# Patient Record
Sex: MALE | Race: ASIAN | NOT HISPANIC OR LATINO | URBAN - METROPOLITAN AREA
[De-identification: names, ages, dates, MRNs, and addresses within clinical notes are randomized per-mention and may not be internally consistent; named-entity substitution may affect disease eponyms.]

---

## 2023-05-10 ENCOUNTER — OFFICE VISIT (OUTPATIENT)
Dept: PRIMARY CARE | Facility: CLINIC | Age: 53
End: 2023-05-10
Payer: COMMERCIAL

## 2023-05-10 ENCOUNTER — LAB (OUTPATIENT)
Dept: LAB | Facility: LAB | Age: 53
End: 2023-05-10
Payer: COMMERCIAL

## 2023-05-10 VITALS
BODY MASS INDEX: 25.43 KG/M2 | WEIGHT: 162 LBS | HEART RATE: 76 BPM | SYSTOLIC BLOOD PRESSURE: 120 MMHG | DIASTOLIC BLOOD PRESSURE: 74 MMHG | HEIGHT: 67 IN

## 2023-05-10 DIAGNOSIS — F41.1 GAD (GENERALIZED ANXIETY DISORDER): ICD-10-CM

## 2023-05-10 DIAGNOSIS — Z00.00 ENCOUNTER FOR PREVENTIVE HEALTH EXAMINATION: ICD-10-CM

## 2023-05-10 DIAGNOSIS — Z11.59 ENCOUNTER FOR HEPATITIS C SCREENING TEST FOR LOW RISK PATIENT: ICD-10-CM

## 2023-05-10 DIAGNOSIS — Z00.00 ENCOUNTER FOR PREVENTIVE HEALTH EXAMINATION: Primary | ICD-10-CM

## 2023-05-10 DIAGNOSIS — F51.04 CHRONIC INSOMNIA: ICD-10-CM

## 2023-05-10 DIAGNOSIS — Z12.11 ENCOUNTER FOR SCREENING FOR MALIGNANT NEOPLASM OF COLON: ICD-10-CM

## 2023-05-10 PROBLEM — E78.2 MIXED HYPERLIPIDEMIA: Status: ACTIVE | Noted: 2023-05-10

## 2023-05-10 PROBLEM — F60.5 OBSESSIVE-COMPULSIVE PERSONALITY DISORDER (MULTI): Status: ACTIVE | Noted: 2023-05-10

## 2023-05-10 PROBLEM — J44.9 CHRONIC OBSTRUCTIVE PULMONARY DISEASE (MULTI): Status: ACTIVE | Noted: 2023-05-10

## 2023-05-10 PROBLEM — E55.9 VITAMIN D DEFICIENCY: Status: ACTIVE | Noted: 2023-05-10

## 2023-05-10 LAB
ALANINE AMINOTRANSFERASE (SGPT) (U/L) IN SER/PLAS: 54 U/L (ref 10–52)
ALBUMIN (G/DL) IN SER/PLAS: 4.2 G/DL (ref 3.4–5)
ALKALINE PHOSPHATASE (U/L) IN SER/PLAS: 59 U/L (ref 33–120)
ANION GAP IN SER/PLAS: 8 MMOL/L (ref 10–20)
ASPARTATE AMINOTRANSFERASE (SGOT) (U/L) IN SER/PLAS: 45 U/L (ref 9–39)
BILIRUBIN TOTAL (MG/DL) IN SER/PLAS: 0.3 MG/DL (ref 0–1.2)
CALCIUM (MG/DL) IN SER/PLAS: 9.2 MG/DL (ref 8.6–10.3)
CARBON DIOXIDE, TOTAL (MMOL/L) IN SER/PLAS: 29 MMOL/L (ref 21–32)
CHLORIDE (MMOL/L) IN SER/PLAS: 105 MMOL/L (ref 98–107)
CHOLESTEROL (MG/DL) IN SER/PLAS: 226 MG/DL (ref 0–199)
CHOLESTEROL IN HDL (MG/DL) IN SER/PLAS: 44.8 MG/DL
CHOLESTEROL/HDL RATIO: 5
CREATININE (MG/DL) IN SER/PLAS: 1.1 MG/DL (ref 0.5–1.3)
ERYTHROCYTE DISTRIBUTION WIDTH (RATIO) BY AUTOMATED COUNT: 12.2 % (ref 11.5–14.5)
ERYTHROCYTE MEAN CORPUSCULAR HEMOGLOBIN CONCENTRATION (G/DL) BY AUTOMATED: 32.6 G/DL (ref 32–36)
ERYTHROCYTE MEAN CORPUSCULAR VOLUME (FL) BY AUTOMATED COUNT: 86 FL (ref 80–100)
ERYTHROCYTES (10*6/UL) IN BLOOD BY AUTOMATED COUNT: 5.53 X10E12/L (ref 4.5–5.9)
ESTIMATED AVERAGE GLUCOSE FOR HBA1C: 120 MG/DL
GFR MALE: 81 ML/MIN/1.73M2
GLUCOSE (MG/DL) IN SER/PLAS: 78 MG/DL (ref 74–99)
HEMATOCRIT (%) IN BLOOD BY AUTOMATED COUNT: 47.6 % (ref 41–52)
HEMOGLOBIN (G/DL) IN BLOOD: 15.5 G/DL (ref 13.5–17.5)
HEMOGLOBIN A1C/HEMOGLOBIN TOTAL IN BLOOD: 5.8 %
HEPATITIS C VIRUS AB PRESENCE IN SERUM: NONREACTIVE
LDL: 148 MG/DL (ref 0–99)
LEUKOCYTES (10*3/UL) IN BLOOD BY AUTOMATED COUNT: 5 X10E9/L (ref 4.4–11.3)
PLATELETS (10*3/UL) IN BLOOD AUTOMATED COUNT: 169 X10E9/L (ref 150–450)
POTASSIUM (MMOL/L) IN SER/PLAS: 4.2 MMOL/L (ref 3.5–5.3)
PROSTATE SPECIFIC AG (NG/ML) IN SER/PLAS: 0.45 NG/ML (ref 0–4)
PROTEIN TOTAL: 7.4 G/DL (ref 6.4–8.2)
SODIUM (MMOL/L) IN SER/PLAS: 138 MMOL/L (ref 136–145)
THYROTROPIN (MIU/L) IN SER/PLAS BY DETECTION LIMIT <= 0.05 MIU/L: 2.11 MIU/L (ref 0.44–3.98)
TRIGLYCERIDE (MG/DL) IN SER/PLAS: 166 MG/DL (ref 0–149)
UREA NITROGEN (MG/DL) IN SER/PLAS: 17 MG/DL (ref 6–23)
VLDL: 33 MG/DL (ref 0–40)

## 2023-05-10 PROCEDURE — 80061 LIPID PANEL: CPT

## 2023-05-10 PROCEDURE — 83036 HEMOGLOBIN GLYCOSYLATED A1C: CPT

## 2023-05-10 PROCEDURE — 3008F BODY MASS INDEX DOCD: CPT | Performed by: STUDENT IN AN ORGANIZED HEALTH CARE EDUCATION/TRAINING PROGRAM

## 2023-05-10 PROCEDURE — 36415 COLL VENOUS BLD VENIPUNCTURE: CPT

## 2023-05-10 PROCEDURE — 86803 HEPATITIS C AB TEST: CPT

## 2023-05-10 PROCEDURE — 80053 COMPREHEN METABOLIC PANEL: CPT

## 2023-05-10 PROCEDURE — 84443 ASSAY THYROID STIM HORMONE: CPT

## 2023-05-10 PROCEDURE — 99214 OFFICE O/P EST MOD 30 MIN: CPT | Performed by: STUDENT IN AN ORGANIZED HEALTH CARE EDUCATION/TRAINING PROGRAM

## 2023-05-10 PROCEDURE — 85027 COMPLETE CBC AUTOMATED: CPT

## 2023-05-10 PROCEDURE — 84153 ASSAY OF PSA TOTAL: CPT

## 2023-05-10 PROCEDURE — 99396 PREV VISIT EST AGE 40-64: CPT | Performed by: STUDENT IN AN ORGANIZED HEALTH CARE EDUCATION/TRAINING PROGRAM

## 2023-05-10 PROCEDURE — 4004F PT TOBACCO SCREEN RCVD TLK: CPT | Performed by: STUDENT IN AN ORGANIZED HEALTH CARE EDUCATION/TRAINING PROGRAM

## 2023-05-10 RX ORDER — TRAZODONE HYDROCHLORIDE 150 MG/1
150 TABLET ORAL DAILY
COMMUNITY
End: 2023-05-10 | Stop reason: SDUPTHER

## 2023-05-10 RX ORDER — SERTRALINE HYDROCHLORIDE 100 MG/1
50 TABLET, FILM COATED ORAL DAILY
COMMUNITY
End: 2023-11-28 | Stop reason: SDUPTHER

## 2023-05-10 RX ORDER — TRAZODONE HYDROCHLORIDE 150 MG/1
150 TABLET ORAL DAILY
Qty: 90 TABLET | Refills: 1 | Status: SHIPPED | OUTPATIENT
Start: 2023-05-10 | End: 2023-11-28 | Stop reason: SDUPTHER

## 2023-05-10 ASSESSMENT — PATIENT HEALTH QUESTIONNAIRE - PHQ9
SUM OF ALL RESPONSES TO PHQ9 QUESTIONS 1 AND 2: 0
1. LITTLE INTEREST OR PLEASURE IN DOING THINGS: NOT AT ALL
2. FEELING DOWN, DEPRESSED OR HOPELESS: NOT AT ALL

## 2023-05-10 NOTE — PROGRESS NOTES
Subjective   Patient ID: Maged Mcbride is a 52 y.o. male who presents for Med Refill and Annual Exam.    HPI  Diet is well balanced.  Exercises regularly.  Due for colon cancer screening.  Vaccines are up to date.  Dental exam is not up to date.  Vision exam is not up to date.  Patient is fasting for labs today.    Other concerns addressed today include: medication refills  Anxiety  He has been on Zoloft 100 mg for the last, around, 10 years and he believes its contributing to his sleep issues.  He at times has trouble falling asleep, which the trazodone 150 mg helps with, but he does wake several times during the night for no reason that he can pinpoint.  He would like to try to come off of the Zoloft and see if his sleep improves.  He states he started the medication on recommendation from his wife because she says that he has a short temper and she has seen a lot of improvement since he has been on the Zoloft.  He is willing to go on to another medication if his mood seems to be worse, but he wants to see how things are off of it.  He does have a lot of 100 mg tabs left at this time so he does not need a refill.      Insomnia  He is taking trazodone 150 mg pretty much nightly for sleep.  It is working okay, and he would like to continue it for now.  He does need a refill of this medication.  He has never tried anything else for sleep, but again, he thinks the Zoloft may be contributing to his insomnia.    Review of Systems  All pertinent positive symptoms are included in the history of present illness.    All other systems have been reviewed and are negative and noncontributory to this patient's current ailments.     Social History     Tobacco Use    Smoking status: Every Day     Packs/day: 0.50     Years: 37.00     Pack years: 18.50     Types: Cigarettes    Smokeless tobacco: Current   Vaping Use    Vaping status: Not on file   Substance Use Topics    Alcohol use: Not on file      No past surgical history on  "file.   No Known Allergies     Current Outpatient Medications   Medication Sig Dispense Refill    sertraline (Zoloft) 100 mg tablet Take 1 tablet (100 mg) by mouth once daily. as directed      traZODone (Desyrel) 150 mg tablet Take 1 tablet (150 mg) by mouth once daily. 90 tablet 1     No current facility-administered medications for this visit.        Patient Active Problem List   Diagnosis    Chronic insomnia    Chronic obstructive pulmonary disease (CMS/HCC)    CARMEN (generalized anxiety disorder)    Mixed hyperlipidemia    Obsessive-compulsive personality disorder (CMS/HCC)    Vitamin D deficiency      Immunization History   Administered Date(s) Administered    Pfizer Purple Cap SARS-CoV-2 05/05/2021, 07/05/2021       Objective   VITALS  /74   Pulse 76   Ht 1.702 m (5' 7\")   Wt 73.5 kg (162 lb)   BMI 25.37 kg/m²      Physical Exam  CONSTITUTIONAL - well nourished, well developed, looks like stated age, in no acute distress, not ill-appearing, and not tired appearing  SKIN - normal skin color and pigmentation, normal skin turgor without rash, lesions, or nodules visualized  HEAD - no trauma, normocephalic  EYES - pupils are equal and reactive to light, extraocular muscles are intact, and normal external exam  ENT - TM's intact, no injection, no signs of infection  NECK - supple without rigidity, no neck mass was observed, no thyromegaly or thyroid nodules  CHEST - clear to auscultation, no wheezing, no crackles and no rales, good effort  CARDIAC - regular rate and regular rhythm, no skipped beats, no murmur  ABDOMEN - no organomegaly, soft, nontender, nondistended, normal bowel sounds, no guarding/rebound/rigidity, negative McBurney sign and negative Cohen sign  EXTREMITIES - no edema, no deformities  NEUROLOGICAL - normal gait, normal balance, normal motor, no ataxia, DTRs equal and symmetrical; alert, oriented and no focal signs  PSYCHIATRIC - alert, pleasant and cordial, age-appropriate  IMMUNOLOGIC - " no cervical lymphadenopathy     Assessment/Plan   Diagnoses and all orders for this visit:  Encounter for preventive health examination  A complete history and physical was performed today.  Vaccines are up to date.  Fasting labs ordered today include:  -     CBC; Future  -     Comprehensive Metabolic Panel; Future  -     Hemoglobin A1C; Future  -     Lipid Panel; Future  -     TSH with reflex to Free T4 if abnormal; Future  -     PSA; Future  -     Hepatitis C Antibody; Future  Encounter for screening for malignant neoplasm of colon  -     Cologuard® colon cancer screening; Future  Chronic insomnia  Continue trazodone, refills provided today  CARMEN (generalized anxiety disorder)  I recommend a slow titration off of the medication, so he is going to start cutting his 100 mg tabs in half and doing this for at least 2 weeks.  As long as he feels okay at the 2-week pablo, we will try to go down to 25 mg.  He will let me know at that time when he is ready for 25 mg tabs to be sent to his pharmacy.  Further plan is he will continue 25 mg for at least 2 weeks and then go to every other day for at least 2 weeks and try to stop the medication after that.  He will then follow-up if he would like to start a new medication.    He should follow-up at least yearly for his physical exam and blood work considering his current cardiovascular risk score is intermediate range at 10.9%.

## 2023-05-11 DIAGNOSIS — R74.8 ELEVATED LIVER ENZYMES: ICD-10-CM

## 2023-05-11 DIAGNOSIS — E78.2 MIXED HYPERLIPIDEMIA: Primary | ICD-10-CM

## 2023-05-24 LAB — NONINV COLON CA DNA+OCC BLD SCRN STL QL: NEGATIVE

## 2023-06-30 ENCOUNTER — OFFICE VISIT (OUTPATIENT)
Dept: PRIMARY CARE | Facility: CLINIC | Age: 53
End: 2023-06-30
Payer: COMMERCIAL

## 2023-06-30 VITALS
WEIGHT: 166 LBS | BODY MASS INDEX: 26.79 KG/M2 | SYSTOLIC BLOOD PRESSURE: 120 MMHG | DIASTOLIC BLOOD PRESSURE: 70 MMHG | HEART RATE: 70 BPM

## 2023-06-30 DIAGNOSIS — F60.5 OBSESSIVE-COMPULSIVE PERSONALITY DISORDER (MULTI): ICD-10-CM

## 2023-06-30 DIAGNOSIS — J42 CHRONIC BRONCHITIS, UNSPECIFIED CHRONIC BRONCHITIS TYPE (MULTI): ICD-10-CM

## 2023-06-30 DIAGNOSIS — F41.1 GAD (GENERALIZED ANXIETY DISORDER): Primary | ICD-10-CM

## 2023-06-30 DIAGNOSIS — G25.81 RESTLESS LEG SYNDROME: ICD-10-CM

## 2023-06-30 PROBLEM — M79.671 RIGHT FOOT PAIN: Status: ACTIVE | Noted: 2023-06-30

## 2023-06-30 PROBLEM — M20.11 VALGUS DEFORMITY OF BOTH GREAT TOES: Status: ACTIVE | Noted: 2023-06-30

## 2023-06-30 PROBLEM — R73.03 PREDIABETES: Status: ACTIVE | Noted: 2023-06-30

## 2023-06-30 PROBLEM — M54.16 LUMBAR RADICULOPATHY, CHRONIC: Status: ACTIVE | Noted: 2023-06-30

## 2023-06-30 PROBLEM — M20.12 VALGUS DEFORMITY OF BOTH GREAT TOES: Status: ACTIVE | Noted: 2023-06-30

## 2023-06-30 PROCEDURE — 3008F BODY MASS INDEX DOCD: CPT | Performed by: STUDENT IN AN ORGANIZED HEALTH CARE EDUCATION/TRAINING PROGRAM

## 2023-06-30 PROCEDURE — 4004F PT TOBACCO SCREEN RCVD TLK: CPT | Performed by: STUDENT IN AN ORGANIZED HEALTH CARE EDUCATION/TRAINING PROGRAM

## 2023-06-30 PROCEDURE — 99214 OFFICE O/P EST MOD 30 MIN: CPT | Performed by: STUDENT IN AN ORGANIZED HEALTH CARE EDUCATION/TRAINING PROGRAM

## 2023-06-30 RX ORDER — FLUOCINONIDE TOPICAL SOLUTION USP, 0.05% 0.5 MG/ML
SOLUTION TOPICAL
COMMUNITY
Start: 2023-05-15

## 2023-06-30 RX ORDER — ROPINIROLE 0.5 MG/1
TABLET, FILM COATED ORAL
Qty: 52 TABLET | Refills: 0 | Status: SHIPPED | OUTPATIENT
Start: 2023-06-30 | End: 2023-07-31

## 2023-06-30 ASSESSMENT — PROMIS GLOBAL HEALTH SCALE
RATE_MENTAL_HEALTH: FAIR
RATE_AVERAGE_FATIGUE: MILD
RATE_GENERAL_HEALTH: GOOD
RATE_QUALITY_OF_LIFE: GOOD
CARRYOUT_PHYSICAL_ACTIVITIES: COMPLETELY
RATE_SOCIAL_SATISFACTION: POOR
EMOTIONAL_PROBLEMS: OFTEN
RATE_PHYSICAL_HEALTH: GOOD
CARRYOUT_SOCIAL_ACTIVITIES: GOOD
RATE_AVERAGE_PAIN: 2

## 2023-06-30 ASSESSMENT — PATIENT HEALTH QUESTIONNAIRE - PHQ9
1. LITTLE INTEREST OR PLEASURE IN DOING THINGS: NOT AT ALL
2. FEELING DOWN, DEPRESSED OR HOPELESS: NOT AT ALL
SUM OF ALL RESPONSES TO PHQ9 QUESTIONS 1 AND 2: 0

## 2023-06-30 NOTE — PROGRESS NOTES
Juan Jose Mcbride is a 52 y.o. year old male presenting for Anxiety       HPI:  Patient is presenting today to discuss his anxiety treatment.  He is down to 50 mg daily of Zoloft and he does not really notice a difference in his anxiety or mood.  He is seeing a psychiatrist at this point for mainly therapy and he feels like it is going well.  He again states that he probably would not even notice if his mood or attitude is any different, it is mainly other people around him not noticed.  He states he would just like to stay on the 50 for now and see how things go.    He is also complaining of restless leg syndrome in his right leg mainly.  He states it has been worsening, especially when he is driving.  He also has this issue at night which is why he is on the trazodone.  He would be open to trying something for his restless leg and see if it can calm it down a little bit because it is really bothering him at this point.  ROS:   All pertinent positive symptoms are included in history of present illness.    All other systems have been reviewed and are negative and noncontributory to this patient's current ailments.    Current Outpatient Medications   Medication Sig Dispense Refill    fluocinonide (Lidex) 0.05 % external solution APPLY TOPICALLY TO SCALP ONCE DAILY AS NEEDED FOR IRRITATION AND/OR ITCHING      sertraline (Zoloft) 100 mg tablet Take 0.5 tablets (50 mg) by mouth once daily. as directed      traZODone (Desyrel) 150 mg tablet Take 1 tablet (150 mg) by mouth once daily. 90 tablet 1     No current facility-administered medications for this visit.       OBJECTIVE  Visit Vitals  /70   Pulse 70   Wt 75.3 kg (166 lb)   BMI 26.79 kg/m²   Smoking Status Every Day   BSA 1.87 m²        Physical Exam:  GENERAL: Alert, oriented, pleasant, in no acute distress  HEENT: Head normocephalic, atraumatic  EXTREMITIES: no edema, no cyanosis  PSYCH: Appropriate mood and affect  SKIN: No rashes or lesions  appreciated    Assessment/Plan   Diagnoses and all orders for this visit:  CARMEN (generalized anxiety disorder)  Obsessive-compulsive personality disorder (CMS/HCC)  Continue Zoloft 50 mg daily and we will reassess towards the end of the year.  Continue therapy as this seems to be quite helpful for him  Restless leg syndrome  Trial of ropinirole and titrate up to 1 mg at night and see if this is helpful or not.  Can send alternative if this is not helpful over the next couple of weeks.  -     rOPINIRole (Requip) 0.5 mg tablet; Take 0.5 tablets (0.25 mg) by mouth once daily in the evening for 2 days, THEN 1 tablet (0.5 mg) once daily in the evening for 5 days, THEN 2 tablets (1 mg) once daily in the evening for 23 days.  Chronic bronchitis, unspecified chronic bronchitis type (CMS/HCC)  Stable without any acute issues.  Currently not on any inhalers and he has not had any breathing issues recently.

## 2023-07-06 ENCOUNTER — TELEPHONE (OUTPATIENT)
Dept: PRIMARY CARE | Facility: CLINIC | Age: 53
End: 2023-07-06
Payer: COMMERCIAL

## 2023-07-06 NOTE — TELEPHONE ENCOUNTER
Rx Refill Request Telephone Encounter    Name:  Juan Jose Mcbride  :  [id number]  Medication Name:  Sertraline  Dose : 100 mg   Route : oral  Frequency : daily  90    Specific Pharmacy location:  [facility]  Date of last appointment:    Date of next appointment:    Best number to reach patient:  [phone]

## 2023-07-27 DIAGNOSIS — G25.81 RESTLESS LEG SYNDROME: ICD-10-CM

## 2023-07-31 RX ORDER — ROPINIROLE 0.5 MG/1
TABLET, FILM COATED ORAL
Qty: 52 TABLET | Refills: 0 | Status: SHIPPED | OUTPATIENT
Start: 2023-07-31

## 2023-10-10 PROBLEM — F32.A DEPRESSIVE DISORDER: Status: ACTIVE | Noted: 2023-10-10

## 2023-10-10 PROBLEM — F17.210 CIGARETTE NICOTINE DEPENDENCE WITHOUT COMPLICATION: Status: ACTIVE | Noted: 2023-10-10

## 2023-10-10 PROBLEM — L40.9 PSORIASIS: Status: ACTIVE | Noted: 2023-05-15

## 2023-10-10 RX ORDER — CLOBETASOL PROPIONATE 0.5 MG/G
1 CREAM TOPICAL
COMMUNITY
Start: 2014-10-05

## 2023-10-10 RX ORDER — METHOTREXATE 2.5 MG/1
5 TABLET ORAL
COMMUNITY
Start: 2015-07-23

## 2023-10-10 RX ORDER — FOLIC ACID 1 MG/1
1 TABLET ORAL
COMMUNITY
Start: 2015-07-23

## 2023-10-10 RX ORDER — CALCIPOTRIENE 50 UG/G
1 CREAM TOPICAL
COMMUNITY
Start: 2014-10-05

## 2023-10-11 ENCOUNTER — TELEMEDICINE (OUTPATIENT)
Dept: BEHAVIORAL HEALTH | Facility: CLINIC | Age: 53
End: 2023-10-11
Payer: COMMERCIAL

## 2023-10-11 DIAGNOSIS — F41.1 GAD (GENERALIZED ANXIETY DISORDER): ICD-10-CM

## 2023-10-11 DIAGNOSIS — F32.A DEPRESSIVE DISORDER: Primary | ICD-10-CM

## 2023-10-11 PROCEDURE — 90837 PSYTX W PT 60 MINUTES: CPT | Performed by: PSYCHOLOGIST
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
## 2023-11-03 ENCOUNTER — APPOINTMENT (OUTPATIENT)
Dept: BEHAVIORAL HEALTH | Facility: CLINIC | Age: 53
End: 2023-11-03
Payer: COMMERCIAL

## 2023-11-09 DIAGNOSIS — F51.04 CHRONIC INSOMNIA: ICD-10-CM

## 2023-11-09 RX ORDER — TRAZODONE HYDROCHLORIDE 150 MG/1
150 TABLET ORAL DAILY
Qty: 90 TABLET | Refills: 1 | OUTPATIENT
Start: 2023-11-09

## 2023-11-28 DIAGNOSIS — F41.1 GAD (GENERALIZED ANXIETY DISORDER): Primary | ICD-10-CM

## 2023-11-28 DIAGNOSIS — F51.04 CHRONIC INSOMNIA: ICD-10-CM

## 2023-11-28 RX ORDER — TRAZODONE HYDROCHLORIDE 150 MG/1
150 TABLET ORAL DAILY
Qty: 30 TABLET | Refills: 0 | Status: SHIPPED | OUTPATIENT
Start: 2023-11-28 | End: 2023-12-07 | Stop reason: SDUPTHER

## 2023-11-28 RX ORDER — SERTRALINE HYDROCHLORIDE 100 MG/1
50 TABLET, FILM COATED ORAL DAILY
Qty: 30 TABLET | Refills: 0 | Status: SHIPPED | OUTPATIENT
Start: 2023-11-28 | End: 2023-12-07 | Stop reason: SDUPTHER

## 2023-11-30 RX ORDER — TRAZODONE HYDROCHLORIDE 150 MG/1
150 TABLET ORAL DAILY
Qty: 90 TABLET | Refills: 1 | OUTPATIENT
Start: 2023-11-30

## 2023-12-07 ENCOUNTER — OFFICE VISIT (OUTPATIENT)
Dept: PRIMARY CARE | Facility: CLINIC | Age: 53
End: 2023-12-07
Payer: COMMERCIAL

## 2023-12-07 VITALS
HEIGHT: 66 IN | WEIGHT: 169 LBS | BODY MASS INDEX: 27.16 KG/M2 | HEART RATE: 78 BPM | SYSTOLIC BLOOD PRESSURE: 124 MMHG | DIASTOLIC BLOOD PRESSURE: 82 MMHG

## 2023-12-07 DIAGNOSIS — F41.1 GAD (GENERALIZED ANXIETY DISORDER): ICD-10-CM

## 2023-12-07 DIAGNOSIS — F60.5 OBSESSIVE-COMPULSIVE PERSONALITY DISORDER (MULTI): Primary | ICD-10-CM

## 2023-12-07 DIAGNOSIS — F51.04 CHRONIC INSOMNIA: ICD-10-CM

## 2023-12-07 PROCEDURE — 4004F PT TOBACCO SCREEN RCVD TLK: CPT | Performed by: STUDENT IN AN ORGANIZED HEALTH CARE EDUCATION/TRAINING PROGRAM

## 2023-12-07 PROCEDURE — 99214 OFFICE O/P EST MOD 30 MIN: CPT | Performed by: STUDENT IN AN ORGANIZED HEALTH CARE EDUCATION/TRAINING PROGRAM

## 2023-12-07 PROCEDURE — 3008F BODY MASS INDEX DOCD: CPT | Performed by: STUDENT IN AN ORGANIZED HEALTH CARE EDUCATION/TRAINING PROGRAM

## 2023-12-07 RX ORDER — SERTRALINE HYDROCHLORIDE 100 MG/1
100 TABLET, FILM COATED ORAL DAILY
Qty: 90 TABLET | Refills: 1 | Status: SHIPPED | OUTPATIENT
Start: 2023-12-07 | End: 2024-06-10 | Stop reason: SDUPTHER

## 2023-12-07 RX ORDER — TRAZODONE HYDROCHLORIDE 100 MG/1
200 TABLET ORAL DAILY
Qty: 180 TABLET | Refills: 1 | Status: SHIPPED | OUTPATIENT
Start: 2023-12-07

## 2023-12-07 ASSESSMENT — PATIENT HEALTH QUESTIONNAIRE - PHQ9
2. FEELING DOWN, DEPRESSED OR HOPELESS: NOT AT ALL
SUM OF ALL RESPONSES TO PHQ9 QUESTIONS 1 AND 2: 0
1. LITTLE INTEREST OR PLEASURE IN DOING THINGS: NOT AT ALL

## 2023-12-07 NOTE — ASSESSMENT & PLAN NOTE
Increase trazodone from 150 mg to 200 mg daily as needed. Prescription has been sent to your pharmacy.

## 2023-12-07 NOTE — ASSESSMENT & PLAN NOTE
Continue Zoloft 50 mg daily and we will reassess towards the end of the year.  Continue therapy as this seems to be quite helpful for him

## 2023-12-07 NOTE — PROGRESS NOTES
"Juan Jose Mcbride is a 53 y.o. year old male presenting for Med Refill       HPI:    Generalized anxiety disorder  Earlier this year patient decreased his Zoloft from 100 mg to 50 mg.  Stated that worked for a while but recently got himself being anxious again and increase his Zoloft back up to 100 mg.    Currently taking Zoloft 100 mg daily    Chronic insomnia  Patient asking to increase his dose of trazodone 150 mg to 200 mg daily.  States that he often finds himself still unable to sleep, states this usually occurs around his days off as he starts thinking about what he is going to do with his day.  Other times he takes half a dose of his trazodone as he feels that he is tired and would not require a full dose to fall asleep.    ROS:   All pertinent positive symptoms are included in history of present illness.    All other systems have been reviewed and are negative and noncontributory to this patient's current ailments.    Current Outpatient Medications   Medication Sig Dispense Refill    calcipotriene (Dovonex) 0.005 % cream 1 Application.      clobetasol (Temovate) 0.05 % cream 1 Application.      fluocinonide (Lidex) 0.05 % external solution APPLY TOPICALLY TO SCALP ONCE DAILY AS NEEDED FOR IRRITATION AND/OR ITCHING      folic acid (Folvite) 1 mg tablet 1 tablet (1 mg).      methotrexate (Trexall) 2.5 mg tablet 5 tablets (12.5 mg total).      rOPINIRole (Requip) 0.5 mg tablet PLEASE SEE ATTACHED FOR DETAILED DIRECTIONS 52 tablet 0    sertraline (Zoloft) 100 mg tablet Take 1 tablet (100 mg) by mouth once daily. as directed 90 tablet 1    traZODone (Desyrel) 100 mg tablet Take 2 tablets (200 mg) by mouth once daily. 180 tablet 1     No current facility-administered medications for this visit.       OBJECTIVE  Visit Vitals  /82   Pulse 78   Ht 1.676 m (5' 6\")   Wt 76.7 kg (169 lb)   BMI 27.28 kg/m²   Smoking Status Every Day   BSA 1.89 m²        Physical Exam:  GENERAL: Alert, oriented, pleasant, in " no acute distress  HEENT: Head normocephalic, atraumatic  CV: Heart with regular rate and rhythm, normal S1/S2, no murmurs; normal peripheral pulses- radial and dorsalis pedis palpable  LUNGS: CTAB without wheezing, rhonchi or rales; good respiratory effort, no increased work of breathing  ABDOMEN: soft, non-tender, non-distended, no masses appreciated; +BS in all four quadrants  EXTREMITIES: no edema, no cyanosis  PSYCH: Appropriate mood and affect  SKIN: No rashes or lesions appreciated    Assessment/Plan   Problem List Items Addressed This Visit             ICD-10-CM    Chronic insomnia F51.04     Increase trazodone from 150 mg to 200 mg daily as needed. Prescription has been sent to your pharmacy.          Relevant Medications    traZODone (Desyrel) 100 mg tablet    CARMEN (generalized anxiety disorder) F41.1    Relevant Medications    sertraline (Zoloft) 100 mg tablet    Obsessive-compulsive personality disorder (CMS/HCC) - Primary F60.5     Continue Zoloft 50 mg daily and we will reassess towards the end of the year.  Continue therapy as this seems to be quite helpful for him          Relevant Medications    sertraline (Zoloft) 100 mg tablet       Follow-up in 6 months for annual physical exam.

## 2024-01-26 DIAGNOSIS — F60.5 OBSESSIVE-COMPULSIVE PERSONALITY DISORDER (MULTI): ICD-10-CM

## 2024-01-26 DIAGNOSIS — F41.1 GAD (GENERALIZED ANXIETY DISORDER): ICD-10-CM

## 2024-01-26 RX ORDER — SERTRALINE HYDROCHLORIDE 100 MG/1
TABLET, FILM COATED ORAL
Qty: 15 TABLET | Refills: 1 | OUTPATIENT
Start: 2024-01-26

## 2024-03-19 DIAGNOSIS — F51.04 CHRONIC INSOMNIA: ICD-10-CM

## 2024-03-19 RX ORDER — TRAZODONE HYDROCHLORIDE 150 MG/1
150 TABLET ORAL DAILY
Qty: 90 TABLET | Refills: 1 | OUTPATIENT
Start: 2024-03-19

## 2024-06-10 DIAGNOSIS — F60.5 OBSESSIVE-COMPULSIVE PERSONALITY DISORDER (MULTI): ICD-10-CM

## 2024-06-10 DIAGNOSIS — F41.1 GAD (GENERALIZED ANXIETY DISORDER): ICD-10-CM

## 2024-06-10 RX ORDER — SERTRALINE HYDROCHLORIDE 100 MG/1
100 TABLET, FILM COATED ORAL DAILY
Qty: 90 TABLET | Refills: 1 | Status: SHIPPED | OUTPATIENT
Start: 2024-06-10

## 2024-06-14 DIAGNOSIS — F51.04 CHRONIC INSOMNIA: ICD-10-CM

## 2024-06-18 RX ORDER — TRAZODONE HYDROCHLORIDE 100 MG/1
200 TABLET ORAL DAILY
Qty: 30 TABLET | Refills: 0 | Status: SHIPPED | OUTPATIENT
Start: 2024-06-18

## 2024-06-26 ENCOUNTER — LAB (OUTPATIENT)
Dept: LAB | Facility: LAB | Age: 54
End: 2024-06-26
Payer: COMMERCIAL

## 2024-06-26 ENCOUNTER — APPOINTMENT (OUTPATIENT)
Dept: PRIMARY CARE | Facility: CLINIC | Age: 54
End: 2024-06-26
Payer: COMMERCIAL

## 2024-06-26 VITALS
OXYGEN SATURATION: 96 % | HEART RATE: 83 BPM | HEIGHT: 66 IN | WEIGHT: 168 LBS | DIASTOLIC BLOOD PRESSURE: 74 MMHG | SYSTOLIC BLOOD PRESSURE: 106 MMHG | BODY MASS INDEX: 27 KG/M2

## 2024-06-26 DIAGNOSIS — J42 CHRONIC BRONCHITIS, UNSPECIFIED CHRONIC BRONCHITIS TYPE (MULTI): ICD-10-CM

## 2024-06-26 DIAGNOSIS — F60.5 OBSESSIVE-COMPULSIVE PERSONALITY DISORDER (MULTI): ICD-10-CM

## 2024-06-26 DIAGNOSIS — L40.9 PSORIASIS: ICD-10-CM

## 2024-06-26 DIAGNOSIS — Z00.00 ENCOUNTER FOR PREVENTIVE HEALTH EXAMINATION: ICD-10-CM

## 2024-06-26 DIAGNOSIS — F51.04 CHRONIC INSOMNIA: ICD-10-CM

## 2024-06-26 DIAGNOSIS — Z00.00 ENCOUNTER FOR PREVENTIVE HEALTH EXAMINATION: Primary | ICD-10-CM

## 2024-06-26 DIAGNOSIS — F41.1 GAD (GENERALIZED ANXIETY DISORDER): ICD-10-CM

## 2024-06-26 DIAGNOSIS — F17.200 CURRENT SMOKER: ICD-10-CM

## 2024-06-26 LAB
ALBUMIN SERPL BCP-MCNC: 4.2 G/DL (ref 3.4–5)
ALP SERPL-CCNC: 67 U/L (ref 33–120)
ALT SERPL W P-5'-P-CCNC: 25 U/L (ref 10–52)
ANION GAP SERPL CALC-SCNC: 10 MMOL/L (ref 10–20)
AST SERPL W P-5'-P-CCNC: 20 U/L (ref 9–39)
BILIRUB SERPL-MCNC: 0.3 MG/DL (ref 0–1.2)
BUN SERPL-MCNC: 14 MG/DL (ref 6–23)
CALCIUM SERPL-MCNC: 8.9 MG/DL (ref 8.6–10.3)
CHLORIDE SERPL-SCNC: 103 MMOL/L (ref 98–107)
CHOLEST SERPL-MCNC: 229 MG/DL (ref 0–199)
CHOLESTEROL/HDL RATIO: 4.6
CO2 SERPL-SCNC: 29 MMOL/L (ref 21–32)
CREAT SERPL-MCNC: 1.15 MG/DL (ref 0.5–1.3)
EGFRCR SERPLBLD CKD-EPI 2021: 77 ML/MIN/1.73M*2
ERYTHROCYTE [DISTWIDTH] IN BLOOD BY AUTOMATED COUNT: 12.3 % (ref 11.5–14.5)
GLUCOSE SERPL-MCNC: 112 MG/DL (ref 74–99)
HCT VFR BLD AUTO: 49.2 % (ref 41–52)
HDLC SERPL-MCNC: 50.3 MG/DL
HGB BLD-MCNC: 16.2 G/DL (ref 13.5–17.5)
LDLC SERPL CALC-MCNC: 142 MG/DL
MCH RBC QN AUTO: 29.1 PG (ref 26–34)
MCHC RBC AUTO-ENTMCNC: 32.9 G/DL (ref 32–36)
MCV RBC AUTO: 88 FL (ref 80–100)
NON HDL CHOLESTEROL: 179 MG/DL (ref 0–149)
NRBC BLD-RTO: 0 /100 WBCS (ref 0–0)
PLATELET # BLD AUTO: 159 X10*3/UL (ref 150–450)
POTASSIUM SERPL-SCNC: 3.6 MMOL/L (ref 3.5–5.3)
PROT SERPL-MCNC: 7.1 G/DL (ref 6.4–8.2)
PSA SERPL-MCNC: 0.6 NG/ML
RBC # BLD AUTO: 5.57 X10*6/UL (ref 4.5–5.9)
SODIUM SERPL-SCNC: 138 MMOL/L (ref 136–145)
TRIGL SERPL-MCNC: 185 MG/DL (ref 0–149)
TSH SERPL-ACNC: 3.28 MIU/L (ref 0.44–3.98)
VLDL: 37 MG/DL (ref 0–40)
WBC # BLD AUTO: 4.7 X10*3/UL (ref 4.4–11.3)

## 2024-06-26 PROCEDURE — 36415 COLL VENOUS BLD VENIPUNCTURE: CPT

## 2024-06-26 PROCEDURE — 4004F PT TOBACCO SCREEN RCVD TLK: CPT | Performed by: STUDENT IN AN ORGANIZED HEALTH CARE EDUCATION/TRAINING PROGRAM

## 2024-06-26 PROCEDURE — 83036 HEMOGLOBIN GLYCOSYLATED A1C: CPT

## 2024-06-26 PROCEDURE — 80061 LIPID PANEL: CPT

## 2024-06-26 PROCEDURE — 99396 PREV VISIT EST AGE 40-64: CPT | Performed by: STUDENT IN AN ORGANIZED HEALTH CARE EDUCATION/TRAINING PROGRAM

## 2024-06-26 PROCEDURE — 99214 OFFICE O/P EST MOD 30 MIN: CPT | Performed by: STUDENT IN AN ORGANIZED HEALTH CARE EDUCATION/TRAINING PROGRAM

## 2024-06-26 PROCEDURE — 84153 ASSAY OF PSA TOTAL: CPT

## 2024-06-26 PROCEDURE — 80053 COMPREHEN METABOLIC PANEL: CPT

## 2024-06-26 PROCEDURE — 84443 ASSAY THYROID STIM HORMONE: CPT

## 2024-06-26 PROCEDURE — 85027 COMPLETE CBC AUTOMATED: CPT

## 2024-06-26 RX ORDER — SERTRALINE HYDROCHLORIDE 100 MG/1
100 TABLET, FILM COATED ORAL DAILY
Qty: 90 TABLET | Refills: 0 | Status: SHIPPED | OUTPATIENT
Start: 2024-06-26

## 2024-06-26 RX ORDER — TRAZODONE HYDROCHLORIDE 100 MG/1
200 TABLET ORAL DAILY
Qty: 180 TABLET | Refills: 2 | Status: SHIPPED | OUTPATIENT
Start: 2024-06-26 | End: 2025-03-23

## 2024-06-26 RX ORDER — VARENICLINE TARTRATE 1 MG/1
TABLET, FILM COATED ORAL
Qty: 60 TABLET | Refills: 2 | Status: SHIPPED | OUTPATIENT
Start: 2024-06-26

## 2024-06-26 ASSESSMENT — PATIENT HEALTH QUESTIONNAIRE - PHQ9
1. LITTLE INTEREST OR PLEASURE IN DOING THINGS: NOT AT ALL
SUM OF ALL RESPONSES TO PHQ9 QUESTIONS 1 AND 2: 0
2. FEELING DOWN, DEPRESSED OR HOPELESS: NOT AT ALL

## 2024-06-26 NOTE — PROGRESS NOTES
"Subjective   Patient ID: Juan Jose Mcbride \"Maribel" is a 53 y.o. male who presents for Annual Exam.    HPI  Diet is well balanced.  Exercises regularly.  Colon cancer screening completed.  Vaccines are up to date.  Dental exam is up to date.  Vision exam is up to date.  Patient is fasting for labs today.  Social: Tobacco use- 0.5 pack daily      Alcohol use- occasional    Other concerns addressed today include:   Anxiety  He is currently taking sertraline 100 mg daily and doing well on this medication.  He believes it still working well for him at its current dose without any side effects, so he would like to continue it.  Denies any depression symptoms or recent exacerbations of his anxiety.    Chronic insomnia  He is currently taking trazodone 200 mg daily for sleep.  He pretty much consistently takes it every night and it works very well for him.  He states he has no side effects and no grogginess in the morning.  He is very happy with how it is working for him and would like to continue it at this time.    Current smoker  He would like to go ahead and start varenicline at this time to stop smoking.  He is smoking about 1/2 pack/day.  Throughout his life he has smoked more than that and he has been smoking since he was 15 years old.    Psoriasis  He is requesting referral to dermatology at this time for treatment of psoriasis.    Review of Systems  All pertinent positive symptoms are included in the history of present illness.    All other systems have been reviewed and are negative and noncontributory to this patient's current ailments.     Social History     Tobacco Use    Smoking status: Every Day     Current packs/day: 0.50     Average packs/day: 0.5 packs/day for 37.0 years (18.5 ttl pk-yrs)     Types: Cigarettes    Smokeless tobacco: Current   Substance Use Topics    Alcohol use: Not on file      History reviewed. No pertinent surgical history.   Allergies   Allergen Reactions    Hay Fever And Allergy " "Relief Runny nose        Current Outpatient Medications   Medication Sig Dispense Refill    calcipotriene (Dovonex) 0.005 % cream 1 Application.      clobetasol (Temovate) 0.05 % cream 1 Application.      fluocinonide (Lidex) 0.05 % external solution APPLY TOPICALLY TO SCALP ONCE DAILY AS NEEDED FOR IRRITATION AND/OR ITCHING      folic acid (Folvite) 1 mg tablet 1 tablet (1 mg).      methotrexate (Trexall) 2.5 mg tablet 5 tablets (12.5 mg total).      rOPINIRole (Requip) 0.5 mg tablet PLEASE SEE ATTACHED FOR DETAILED DIRECTIONS 52 tablet 0    sertraline (Zoloft) 100 mg tablet Take 1 tablet (100 mg) by mouth once daily. as directed 90 tablet 0    traZODone (Desyrel) 100 mg tablet Take 2 tablets (200 mg) by mouth once daily. 180 tablet 2     No current facility-administered medications for this visit.        Patient Active Problem List   Diagnosis    Chronic insomnia    Chronic obstructive pulmonary disease (Multi)    CARMEN (generalized anxiety disorder)    Mixed hyperlipidemia    Obsessive-compulsive personality disorder (Multi)    Vitamin D deficiency    Lumbar radiculopathy, chronic    Prediabetes    Right foot pain    Valgus deformity of both great toes    Psoriasis    Cigarette nicotine dependence without complication    Depressive disorder      Immunization History   Administered Date(s) Administered    Pfizer Purple Cap SARS-CoV-2 05/05/2021, 07/05/2021    Tdap vaccine, age 7 year and older (BOOSTRIX, ADACEL) 04/06/2017       Objective   VITALS  /74 (BP Location: Left arm, Patient Position: Sitting, BP Cuff Size: Adult)   Pulse 83   Ht 1.676 m (5' 6\")   Wt 76.2 kg (168 lb)   SpO2 96%   BMI 27.12 kg/m²      Physical Exam  CONSTITUTIONAL - well nourished, well developed, looks like stated age, in no acute distress, not ill-appearing, and not tired appearing  SKIN - normal skin color and pigmentation, normal skin turgor without rash, lesions, or nodules visualized  HEAD - no trauma, normocephalic  EYES - " pupils are equal and reactive to light, extraocular muscles are intact, and normal external exam  ENT - TM's intact, no injection, no signs of infection, uvula midline, normal tongue movement and throat normal, no exudate, nasal passage without discharge and patent  NECK - supple without rigidity, no neck mass was observed, no thyromegaly or thyroid nodules  CHEST - clear to auscultation, no wheezing, no crackles and no rales, good effort  CARDIAC - regular rate and regular rhythm, no skipped beats, no murmur  ABDOMEN - no organomegaly, soft, nontender, nondistended, normal bowel sounds, no guarding/rebound/rigidity, negative McBurney sign and negative Cohen sign  EXTREMITIES - no edema, no deformities  NEUROLOGICAL - normal gait, normal balance, normal motor, no ataxia, DTRs equal and symmetrical; alert, oriented and no focal signs  PSYCHIATRIC - alert, pleasant and cordial, age-appropriate  IMMUNOLOGIC - no cervical lymphadenopathy     Assessment/Plan   Diagnoses and all orders for this visit:  Encounter for preventive health examination  A complete history and physical was performed today.  Vaccines are up to date.  Colon cancer screening is up to date.  Fasting labs ordered today include:  -     CBC; Future  -     Comprehensive Metabolic Panel; Future  -     Hemoglobin A1C; Future  -     Lipid Panel; Future  -     TSH with reflex to Free T4 if abnormal; Future  -     PSA; Future  CARMEN (generalized anxiety disorder)/Obsessive-compulsive personality disorder (Multi)  Doing well on current medication, refills provided  -     sertraline (Zoloft) 100 mg tablet; Take 1 tablet (100 mg) by mouth once daily. as directed  Chronic insomnia  Doing well on current medication, refills provided  -     traZODone (Desyrel) 100 mg tablet; Take 2 tablets (200 mg) by mouth once daily.  Current smoker  Patient requested prescription of vareniciline to try to quit smoking over the next few months.  Directions were given to start this  medication and continue it.  He will check in with me in about 2 to 3 months to let me know if he was successful or not  -     varenicline (Chantix) 1 mg tablet; Take a half tab daily for 3 days, then increase to a half tab twice daily for 4 days, then increase to a full tab in the morning and half tab in the evening for 7 days, then increase to 1 tab twice daily  Psoriasis  -     Referral to Dermatology  Chronic bronchitis, unspecified chronic bronchitis type (Multi)  No current concerns, continue to monitor    He will follow-up in about 9 months

## 2024-06-27 DIAGNOSIS — E78.2 MIXED HYPERLIPIDEMIA: Primary | ICD-10-CM

## 2024-06-27 LAB
EST. AVERAGE GLUCOSE BLD GHB EST-MCNC: 114 MG/DL
HBA1C MFR BLD: 5.6 %

## 2024-07-28 DIAGNOSIS — F17.200 CURRENT SMOKER: ICD-10-CM

## 2024-07-29 DIAGNOSIS — F17.200 CURRENT SMOKER: ICD-10-CM

## 2024-07-29 RX ORDER — VARENICLINE TARTRATE 1 MG/1
TABLET, FILM COATED ORAL
Qty: 180 TABLET | Refills: 1 | Status: SHIPPED | OUTPATIENT
Start: 2024-07-29

## 2024-07-29 RX ORDER — VARENICLINE TARTRATE 1 MG/1
TABLET, FILM COATED ORAL
Qty: 180 TABLET | Refills: 1 | Status: SHIPPED | OUTPATIENT
Start: 2024-07-29 | End: 2024-07-29

## 2024-09-23 DIAGNOSIS — F41.1 GAD (GENERALIZED ANXIETY DISORDER): ICD-10-CM

## 2024-09-23 DIAGNOSIS — F51.04 CHRONIC INSOMNIA: ICD-10-CM

## 2024-09-23 DIAGNOSIS — F60.5 OBSESSIVE-COMPULSIVE PERSONALITY DISORDER (MULTI): ICD-10-CM

## 2024-09-23 RX ORDER — SERTRALINE HYDROCHLORIDE 100 MG/1
100 TABLET, FILM COATED ORAL DAILY
Qty: 90 TABLET | Refills: 1 | Status: SHIPPED | OUTPATIENT
Start: 2024-09-23

## 2024-09-23 RX ORDER — TRAZODONE HYDROCHLORIDE 100 MG/1
200 TABLET ORAL DAILY
Qty: 180 TABLET | Refills: 1 | Status: SHIPPED | OUTPATIENT
Start: 2024-09-23 | End: 2025-03-22

## 2024-11-19 ENCOUNTER — OFFICE VISIT (OUTPATIENT)
Dept: URGENT CARE | Age: 54
End: 2024-11-19
Payer: COMMERCIAL

## 2024-11-19 ENCOUNTER — ANCILLARY PROCEDURE (OUTPATIENT)
Dept: URGENT CARE | Age: 54
End: 2024-11-19
Payer: COMMERCIAL

## 2024-11-19 VITALS — HEART RATE: 78 BPM | OXYGEN SATURATION: 97 % | SYSTOLIC BLOOD PRESSURE: 120 MMHG | DIASTOLIC BLOOD PRESSURE: 66 MMHG

## 2024-11-19 DIAGNOSIS — S20.211A CONTUSION OF RIGHT CHEST WALL, INITIAL ENCOUNTER: ICD-10-CM

## 2024-11-19 DIAGNOSIS — S20.211A CONTUSION OF RIGHT CHEST WALL, INITIAL ENCOUNTER: Primary | ICD-10-CM

## 2024-11-19 NOTE — PROGRESS NOTES
Patient presents for workmans comp injury sustained 11/14/2024. 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 Explains that he didn't have pain at first, but over the last 3 days he has had increased pain with movement, twisting motion and lifting right arm. Denies shortness of breath. Denies any deformity, bruising, rashes or other skin changes.       Injury      Physical Exam  Constitutional:       General: He is not in acute distress.     Appearance: He is not ill-appearing.   Pulmonary:      Effort: Pulmonary effort is normal. No respiratory distress.      Breath sounds: Normal breath sounds. No wheezing.   Chest:      Chest wall: Tenderness present. No deformity or crepitus.      Comments: Tenderness to palpation over right anterior lower ribs.   Neurological:      Mental Status: He is alert.           _____________MEDCO-14 Physician's Report of Work Ability [facility]-[id number]_________________      Injured Worker:  Date of injury: Claim number:   Juan Jose Riverai 11/14/2024 FROI     Date of last appointment /examination: Date of this appointment /examination:  Date of next appointment /examination:    FROI   11/19/24  PRN     Employer name: Injured worker's position of employment at time of injury:   [employer]         MEDCO-14 submission   1.  Please select one of the following options: I have never completed a MEDCO-14. Proceed to section 2.         Employment/Occupation (Complete this section and proceed to section 3)  2. Updates made to Employment/Occupation Section: Yes    Have you reviewed the description of the injured worker's job held on the date of the injury (former position of employment)? Yes, job description provided by: Injured worker       Work Status/Injured worker's capabilities.   3a. Updates made to work status/injured worker's capabilities: Yes    Does the injured worker have any  physical or health restrictions related to allowed conditions in the claim? Yes, the restrictions are temporary - Proceed to section 3B.    3b. If restrictions, can the injured worker return to full duties of his/her job held on the date of injury (former position of employment)?     No. The injured worker could not do the job held on the date of the injury for this period of restricted duty. Date: 11/19/2024    Estimate of when the injured worker should be able to return to the job held on the date of injury for this period of restricted duty. Date: 11/24/2024  Proceed to section 3C.    3c. Please indicate which of the activities listed below the injured worker can perform (even if the response to 3B is No.)     The injured worker is not released to the former position of employment but may return to available and appropriate work with restrictions, the possible return to work date: 11/19/2024    The injured worker can perform simple grasping with: Both  The injured worker can perform repetitive wrist motion with: Both  The injured worker's dominant hand is: Right  The injured worker can perform repetitive actions to operate foot controls or motor vehicles with: Both    If the injured worker is taking prescribed medications for the allowed conditions in this claim, can the injured worker safely:   *Operate heavy machinery: Yes  *Drive: Yes  *Perform other critical job tasks as defined by any source listed above in section 2: No     Please indicate the following: Never, Occasionally, Frequently, Continuously    Activity   Bend: Occasionally  Squat / kneel: Occasionally  Twist / turn: Never  Climb: Never Reach above shoulder: Never  Type / Keyboard: Continuously  Work w/cold substances: Continuously  Work w/hot substances: Continuously      Lifting/Carrying                                      Pushing/pulling  0 - 10 lbs: Frequently  11 - 20 lbs: Frequently  21 - 40 lbs: Never  41 - 60 lbs: Never  61 - 100 lbs: Never  "0 - 25 lbs: Occasionally  26 - 40 lbs: Never  41 -  60 lbs: Never  61 - 100 lbs: Never  100 + lbs: Never       How many total hours can the injured worker work?  full     In an eight-hour workday, how many total hours is the injured worker potentially able to work?  full    Sit: 8 hours,  Continuously  Walk: 8 hours, With Break  Stand: 8 hours, With Break          Does the injured worker have any functional restrictions based only on allowed psychological conditions? No    *Note: If Yes is indicated please reference the MEDCO-16 as needed.     Additionally, in this space please provide any additional information addressing the  injured worker's capabilities and/or job accommodations which may not be addressed above. N/A       Disability information   4a.  *Note: If 3B above is \"No\" or dates updated - all 4A fields, including site/location if applicable must be completed    Updates: No    Complete the chart below and furnish the narrative description of the diagnosis(es), site/location, if applicable, and ICD code for the conditions being treated due to the work- related injury/disease.  Please indicate if the condition is preventing the injured worker from returning to job duties he/she held on the date of injury.       Narrative description of the work-related allowed condition Site/Location if applicable ICD Code Is the condition preventing full duty release to the job injured worker held on date of injury?    Contusion right chest wall right S20.211A Yes      No      No      No      No      No        4B. List all other relevant conditions that impact treatment of the conditions listed above (e.g., co-morbidities or not yet allowed conditions).   N/A     Clinical findings:    5. You can reference office notes in lieu of writing clinical findings below.  Updates:     The injured worker is progressing:     Provide your clinical and objective findings supporting your medical opinion outlined on this form.  List " barriers to return to work and reason, for the injured worker's delay in recovery.        Maximum medical improvement (MMI):  6. Updates:     MMI is a treatment plateau (static or well-stabilized) at which no fundamental       functional or physiological change can be expected within reasonable medical       probability, in spite of continuing medical or rehabilitative procedures.     Has the work-related injury(s) or occupational disease reached MMI based on the definition above?     *Note: An injured worker may need supportive treatment to maintain his or her level of function after reaching MMI. Thus, periodic medical treatment may still be requested and provided.      Vocational rehabilitation:   7. Updates:     Vocational rehabilitation is an individualized and voluntary program for an eligible injured worker who needs assistance in safely returning to work or in retaining employment.  This program can be tailored around an injured worker's restrictions and may provide job seeking skills or necessary retraining. Is the injured worker a candidate for vocational rehabilitation services focusing on return to work?      Treating physician signature - mandatory:  8. I certify the information on this form is correct to the best of my knowledge. I am aware that any person who knowingly makes a false statement, misrepresentation, concealment of fact or any other act of fraud to obtain payment as provided by [facility], or who knowingly accepts payment to which that person is not entitled, is subject to felony criminal prosecution and may be punished, under appropriate criminal provisions. by a fine or imprisonment or both.     Treating physician's name (please print legibly): Becky Gates PA-C  [facility] provider ([place]) number: [phone]    Complete Address, Telephone, Fax number and Date:   [facility]  [address] [place] [address]  [phone]    Treating physician's signature: Becky  HAIDER Gates PA-C     [facility] injury. History and examination consistent with contusion. No evidence of compartment syndrome, sprain, or cellulitis. Imaging is negative for fractures or other acute bony abnormalities. Plan is for RICE and supportive treatment at home. Light duty until 11/24/2024, follow up with occupational health if no improvement, see medco 14. Patient verbalized understanding and agrees with plan.       Patient disposition: Home

## 2025-01-30 DIAGNOSIS — F51.04 CHRONIC INSOMNIA: ICD-10-CM

## 2025-01-31 RX ORDER — TRAZODONE HYDROCHLORIDE 100 MG/1
200 TABLET ORAL DAILY
Qty: 60 TABLET | Refills: 0 | Status: SHIPPED | OUTPATIENT
Start: 2025-01-31

## 2025-05-09 ENCOUNTER — PATIENT MESSAGE (OUTPATIENT)
Facility: CLINIC | Age: 55
End: 2025-05-09
Payer: COMMERCIAL

## 2025-05-09 DIAGNOSIS — F51.04 CHRONIC INSOMNIA: ICD-10-CM

## 2025-05-09 DIAGNOSIS — F41.1 GAD (GENERALIZED ANXIETY DISORDER): ICD-10-CM

## 2025-05-09 DIAGNOSIS — F60.5 OBSESSIVE-COMPULSIVE PERSONALITY DISORDER (MULTI): ICD-10-CM

## 2025-05-13 RX ORDER — SERTRALINE HYDROCHLORIDE 100 MG/1
100 TABLET, FILM COATED ORAL DAILY
Qty: 30 TABLET | Refills: 0 | Status: SHIPPED | OUTPATIENT
Start: 2025-05-13 | End: 2025-06-12

## 2025-05-13 RX ORDER — TRAZODONE HYDROCHLORIDE 100 MG/1
200 TABLET ORAL DAILY
Qty: 60 TABLET | Refills: 0 | Status: SHIPPED | OUTPATIENT
Start: 2025-05-13 | End: 2025-06-12

## 2025-06-13 ENCOUNTER — APPOINTMENT (OUTPATIENT)
Dept: PRIMARY CARE | Facility: CLINIC | Age: 55
End: 2025-06-13
Payer: COMMERCIAL

## 2025-06-13 VITALS
BODY MASS INDEX: 27.48 KG/M2 | OXYGEN SATURATION: 98 % | WEIGHT: 171 LBS | HEIGHT: 66 IN | SYSTOLIC BLOOD PRESSURE: 120 MMHG | DIASTOLIC BLOOD PRESSURE: 66 MMHG | HEART RATE: 78 BPM

## 2025-06-13 DIAGNOSIS — Z13.6 SCREENING FOR CARDIOVASCULAR CONDITION: ICD-10-CM

## 2025-06-13 DIAGNOSIS — Z12.5 SCREENING FOR PROSTATE CANCER: ICD-10-CM

## 2025-06-13 DIAGNOSIS — Z13.9 SCREENING FOR CONDITION: ICD-10-CM

## 2025-06-13 DIAGNOSIS — F60.5 OBSESSIVE-COMPULSIVE PERSONALITY DISORDER (MULTI): Primary | ICD-10-CM

## 2025-06-13 DIAGNOSIS — J41.0 SIMPLE CHRONIC BRONCHITIS (MULTI): ICD-10-CM

## 2025-06-13 DIAGNOSIS — Z13.29 SCREENING FOR ENDOCRINE DISORDER: ICD-10-CM

## 2025-06-13 DIAGNOSIS — Z00.00 ENCOUNTER FOR PREVENTIVE HEALTH EXAMINATION: ICD-10-CM

## 2025-06-13 DIAGNOSIS — Z13.1 SCREENING FOR DIABETES MELLITUS: ICD-10-CM

## 2025-06-13 DIAGNOSIS — F51.04 CHRONIC INSOMNIA: ICD-10-CM

## 2025-06-13 DIAGNOSIS — M79.604 RIGHT LEG PAIN: ICD-10-CM

## 2025-06-13 DIAGNOSIS — F41.1 GAD (GENERALIZED ANXIETY DISORDER): ICD-10-CM

## 2025-06-13 PROBLEM — J44.9 CHRONIC OBSTRUCTIVE PULMONARY DISEASE (MULTI): Status: RESOLVED | Noted: 2023-05-10 | Resolved: 2025-06-13

## 2025-06-13 PROCEDURE — 99214 OFFICE O/P EST MOD 30 MIN: CPT | Performed by: STUDENT IN AN ORGANIZED HEALTH CARE EDUCATION/TRAINING PROGRAM

## 2025-06-13 PROCEDURE — 3008F BODY MASS INDEX DOCD: CPT | Performed by: STUDENT IN AN ORGANIZED HEALTH CARE EDUCATION/TRAINING PROGRAM

## 2025-06-13 RX ORDER — SERTRALINE HYDROCHLORIDE 100 MG/1
100 TABLET, FILM COATED ORAL DAILY
Qty: 90 TABLET | Refills: 3 | Status: SHIPPED | OUTPATIENT
Start: 2025-06-13 | End: 2026-06-13

## 2025-06-13 RX ORDER — TRAZODONE HYDROCHLORIDE 100 MG/1
200 TABLET ORAL DAILY
Qty: 180 TABLET | Refills: 3 | Status: SHIPPED | OUTPATIENT
Start: 2025-06-13 | End: 2026-06-13

## 2025-06-13 ASSESSMENT — PATIENT HEALTH QUESTIONNAIRE - PHQ9
2. FEELING DOWN, DEPRESSED OR HOPELESS: NOT AT ALL
1. LITTLE INTEREST OR PLEASURE IN DOING THINGS: NOT AT ALL
SUM OF ALL RESPONSES TO PHQ9 QUESTIONS 1 AND 2: 0

## 2025-06-13 NOTE — PROGRESS NOTES
"Juan Jose Mcbride is a 54 y.o. year old male presenting for 6 month f/up       HPI:  OCD/anxiety  He is currently taking sertraline 100 mg daily and doing well on this medication without side effects.  He feels like his symptoms are well-controlled at this dose, stating that his wife notices that he is much more pleasant to be around when he is taking it.  He has not had any recent exacerbations of his anxiety and reports no depression symptoms at this time.    Insomnia  He is currently taking trazodone 200 mg daily for sleep and he states is working very well for him without any side effects.  He states he falls asleep within 15 minutes of taking the medication and he is very happy with that.    Right leg symptoms  He states that he has this problem throughout the day when he is mainly sitting, he feels like he just needs to move his leg around a lot.  He states that he just feels really antsy in that leg, feeling like it is going to lose feeling and it makes him a little anxious.  He is wondering if this could be a nerve problem or something of that nature.  He does not think it psychological, but he would like to get an answer if possible.  It is kind of disruptive and when it is occurring, its all he can think about.    ROS:   All pertinent positive symptoms are included in history of present illness.    All other systems have been reviewed and are negative and noncontributory to this patient's current ailments.    Current Medications[1]    OBJECTIVE  Visit Vitals  /66   Pulse 78   Ht 1.676 m (5' 6\")   Wt 77.6 kg (171 lb)   SpO2 98%   BMI 27.60 kg/m²   Smoking Status Every Day   BSA 1.9 m²        Physical Exam:  GENERAL: Alert, oriented, pleasant, in no acute distress  HEENT: Head normocephalic, atraumatic  CV: Heart with regular rate and rhythm, normal S1/S2, no murmurs; normal peripheral pulses- radial and dorsalis pedis palpable  LUNGS: CTAB without wheezing, rhonchi or rales; good respiratory " effort, no increased work of breathing  EXTREMITIES: no edema, no cyanosis  PSYCH: Appropriate mood and affect  SKIN: No rashes or lesions appreciated    Assessment/Plan   Diagnoses and all orders for this visit:  Obsessive-compulsive personality disorder (Multi)  Doing well on current medication, refills provided  -     sertraline (Zoloft) 100 mg tablet; Take 1 tablet (100 mg) by mouth once daily. as directed  CARMEN (generalized anxiety disorder)  Well-controlled on current medication, refills provided  -     sertraline (Zoloft) 100 mg tablet; Take 1 tablet (100 mg) by mouth once daily. as directed  Chronic insomnia  -     Refill traZODone (Desyrel) 100 mg tablet; Take 2 tablets (200 mg) by mouth once daily.  Simple chronic bronchitis (Multi)  Asymptomatic at this time, continue to monitor  Right leg pain  It sounds like he is having restless leg syndrome and just the right leg only while sitting.  Not really sure what else this could be at this time, but will assess for a nerve condition with an EMG at this time.  Can always consider a gabapentin to try to calm that leg down while he is sitting.  Further plan pending the result  -     EMG & nerve conduction; Future    He is due for yearly blood work, so I ordered the below labs for him  Encounter for preventive health examination  -     CBC; Future  -     Comprehensive Metabolic Panel; Future  -     Hemoglobin A1C; Future  -     Lipid Panel; Future  -     TSH with reflex to Free T4 if abnormal; Future  Screening for condition  -     CBC; Future  -     Comprehensive Metabolic Panel; Future  Screening for diabetes mellitus  -     Hemoglobin A1C; Future  Screening for cardiovascular condition  -     Lipid Panel; Future  Screening for endocrine disorder  -     TSH with reflex to Free T4 if abnormal; Future  Screening for prostate cancer  -     Prostate Specific Antigen; Future      Return in 1 year and sooner as needed            [1]   Current Outpatient Medications    Medication Sig Dispense Refill    calcipotriene (Dovonex) 0.005 % cream 1 Application.      clobetasol (Temovate) 0.05 % cream 1 Application.      fluocinonide (Lidex) 0.05 % external solution APPLY TOPICALLY TO SCALP ONCE DAILY AS NEEDED FOR IRRITATION AND/OR ITCHING      folic acid (Folvite) 1 mg tablet 1 tablet (1 mg).      methotrexate (Trexall) 2.5 mg tablet 5 tablets (12.5 mg total).      rOPINIRole (Requip) 0.5 mg tablet PLEASE SEE ATTACHED FOR DETAILED DIRECTIONS 52 tablet 0    varenicline (Chantix) 1 mg tablet Start a half tab daily for 3 days, then increase to half tab twice daily. Increase by a half tab every 3 days until you get to a full tab twice daily 180 tablet 1    sertraline (Zoloft) 100 mg tablet Take 1 tablet (100 mg) by mouth once daily. as directed 30 tablet 0    traZODone (Desyrel) 100 mg tablet Take 2 tablets (200 mg) by mouth once daily. 60 tablet 0     No current facility-administered medications for this visit.

## 2025-06-14 LAB
ALBUMIN SERPL-MCNC: 4.4 G/DL (ref 3.6–5.1)
ALP SERPL-CCNC: 70 U/L (ref 35–144)
ALT SERPL-CCNC: 32 U/L (ref 9–46)
ANION GAP SERPL CALCULATED.4IONS-SCNC: 8 MMOL/L (CALC) (ref 7–17)
AST SERPL-CCNC: 22 U/L (ref 10–35)
BILIRUB SERPL-MCNC: 0.3 MG/DL (ref 0.2–1.2)
BUN SERPL-MCNC: 16 MG/DL (ref 7–25)
CALCIUM SERPL-MCNC: 8.8 MG/DL (ref 8.6–10.3)
CHLORIDE SERPL-SCNC: 101 MMOL/L (ref 98–110)
CHOLEST SERPL-MCNC: 237 MG/DL
CHOLEST/HDLC SERPL: 5.5 (CALC)
CO2 SERPL-SCNC: 28 MMOL/L (ref 20–32)
CREAT SERPL-MCNC: 1.28 MG/DL (ref 0.7–1.3)
EGFRCR SERPLBLD CKD-EPI 2021: 67 ML/MIN/1.73M2
ERYTHROCYTE [DISTWIDTH] IN BLOOD BY AUTOMATED COUNT: 13.8 % (ref 11–15)
EST. AVERAGE GLUCOSE BLD GHB EST-MCNC: 114 MG/DL
EST. AVERAGE GLUCOSE BLD GHB EST-SCNC: 6.3 MMOL/L
GLUCOSE SERPL-MCNC: 120 MG/DL (ref 65–99)
HBA1C MFR BLD: 5.6 %
HCT VFR BLD AUTO: 48.8 % (ref 38.5–50)
HDLC SERPL-MCNC: 43 MG/DL
HGB BLD-MCNC: 15.7 G/DL (ref 13.2–17.1)
LDLC SERPL CALC-MCNC: 153 MG/DL (CALC)
MCH RBC QN AUTO: 29.2 PG (ref 27–33)
MCHC RBC AUTO-ENTMCNC: 32.2 G/DL (ref 32–36)
MCV RBC AUTO: 90.7 FL (ref 80–100)
NONHDLC SERPL-MCNC: 194 MG/DL (CALC)
PLATELET # BLD AUTO: 163 THOUSAND/UL (ref 140–400)
PMV BLD REES-ECKER: 10.3 FL (ref 7.5–12.5)
POTASSIUM SERPL-SCNC: 4.2 MMOL/L (ref 3.5–5.3)
PROT SERPL-MCNC: 7.4 G/DL (ref 6.1–8.1)
PSA SERPL-MCNC: 0.46 NG/ML
RBC # BLD AUTO: 5.38 MILLION/UL (ref 4.2–5.8)
SODIUM SERPL-SCNC: 137 MMOL/L (ref 135–146)
TRIGL SERPL-MCNC: 239 MG/DL
TSH SERPL-ACNC: 1.47 MIU/L (ref 0.4–4.5)
WBC # BLD AUTO: 6.4 THOUSAND/UL (ref 3.8–10.8)